# Patient Record
Sex: MALE | Race: WHITE | NOT HISPANIC OR LATINO | Employment: UNEMPLOYED | URBAN - METROPOLITAN AREA
[De-identification: names, ages, dates, MRNs, and addresses within clinical notes are randomized per-mention and may not be internally consistent; named-entity substitution may affect disease eponyms.]

---

## 2023-03-17 ENCOUNTER — TELEPHONE (OUTPATIENT)
Dept: GASTROENTEROLOGY | Facility: CLINIC | Age: 43
End: 2023-03-17

## 2023-03-17 ENCOUNTER — CONSULT (OUTPATIENT)
Dept: GASTROENTEROLOGY | Facility: CLINIC | Age: 43
End: 2023-03-17

## 2023-03-17 VITALS
DIASTOLIC BLOOD PRESSURE: 80 MMHG | SYSTOLIC BLOOD PRESSURE: 120 MMHG | HEIGHT: 71 IN | BODY MASS INDEX: 25.03 KG/M2 | WEIGHT: 178.8 LBS

## 2023-03-17 DIAGNOSIS — K21.9 GASTROESOPHAGEAL REFLUX DISEASE, UNSPECIFIED WHETHER ESOPHAGITIS PRESENT: ICD-10-CM

## 2023-03-17 DIAGNOSIS — R10.84 GENERALIZED ABDOMINAL PAIN: Primary | ICD-10-CM

## 2023-03-17 RX ORDER — OMEPRAZOLE 40 MG/1
40 CAPSULE, DELAYED RELEASE ORAL DAILY
Qty: 90 CAPSULE | Refills: 1 | Status: SHIPPED | OUTPATIENT
Start: 2023-03-17

## 2023-03-17 RX ORDER — OMEPRAZOLE 20 MG/1
40 TABLET, DELAYED RELEASE ORAL DAILY
COMMUNITY
Start: 2023-01-31 | End: 2023-03-17

## 2023-03-17 NOTE — TELEPHONE ENCOUNTER
Patient called in stating that he is running late  Pt states that his GPS says he will arrive by 1:35

## 2023-03-17 NOTE — PROGRESS NOTES
[id number] - Outpatient Consultation  Kiana Han 38 y o  male MRN: [id number]  Encounter: [id number]    ASSESSMENT AND PLAN:      1  Generalized abdominal pain  Likely in the setting of reflux disease  Will start omeprazole 40 mg once daily  Plan for EGD to evaluate for ulcerative disease or erosive disease that caused his episodes of black stool which may be concerning for melena  CBC and CMP unremarkable  Lipase within normal limits  CT was reviewed by me which did not show any acute abdominal processes  Recommended lifestyle modifications of weight loss, diet and exercise, avoidance of triggers, avoidance of late-night meals, elevation of the head of the bed    - omeprazole (PriLOSEC) 40 MG capsule; Take 1 capsule (40 mg total) by mouth daily  Dispense: 90 capsule; Refill: 1  - EGD; Future    2  Gastroesophageal reflux disease, unspecified whether esophagitis present  As above  - omeprazole (PriLOSEC) 40 MG capsule; Take 1 capsule (40 mg total) by mouth daily  Dispense: 90 capsule; Refill: 1  - EGD; Future      Follow up Appointment: 6 months    Chief Complaint   Patient presents with   • ER follow up [facility]       HPI:   47-year-old male past medical history of anxiety who presents after emergency room visit for abdominal pain  Has had a chronic abdominal pain for the past year and worsened over the span of a few weeks prior to his ER visit in February 2023  Has had black stools on 2 occasions that resolved spontaneously  Did have some nausea without vomiting  Was started on pantoprazole which helped his symptoms greatly  No history of intra-abdominal surgeries  CT vital signs were stable  CBC and CMP were largely unremarkable  Lipase within normal limits  CT abdomen pelvis without any acute intra-abdominal process  He states his pain is a bloating/gas-like pain  Denies any nausea vomiting dysphagia  Did have some black stool prior    Resolved spontaneously  Denies any changes of bowel habits  No current GI bleeding  No unintentional weight loss  GI History:  Blood thinners: Denies ASA, antiplatelet, or anticoagulation  NSAID use: Denies  Insulin use: None    Abdominal Surgical Hx: None  Family Hx: Denies first degree relatives with GI malignancies  GI procedure Hx: No hx of EGD or colonoscopies    Historical Information   Past Medical History:   Diagnosis Date   • GERD (gastroesophageal reflux disease)      Past Surgical History:   Procedure Laterality Date   • HERNIA REPAIR     • MULTIPLE TOOTH EXTRACTIONS       Social History     Substance and Sexual Activity   Alcohol Use Yes    Comment: socially     Social History     Substance and Sexual Activity   Drug Use Not on file     Social History     Tobacco Use   Smoking Status Every Day   • Types: Cigarettes   Smokeless Tobacco Former     Family History   Problem Relation Age of Onset   • Cancer Mother    • Breast cancer additional onset Mother    • No Known Problems Father    • Colon cancer Neg Hx    • Colon polyps Neg Hx        Meds/Allergies     Current Outpatient Medications:   •  omeprazole (PriLOSEC) 40 MG capsule    No Known Allergies    PHYSICAL EXAM:    Blood pressure 120/80, height 5' 11" (1 803 m), weight 81 1 kg (178 lb 12 8 oz)  Body mass index is 24 94 kg/m²  General Appearance: NAD, cooperative, alert  Eyes: Anicteric  GI:  Soft, non-tender, non-distended; normal bowel sounds; no masses, no organomegaly   Rectal: Deferred  Musculoskeletal: No edema    Skin:  No jaundice    Lab Results:   Lab Results   Component Value Date    WBC 8 80 06/05/2015    HGB 14 9 06/05/2015    MCV 93 06/05/2015     06/05/2015     Lab Results   Component Value Date     06/05/2015    K 3 3 (L) 06/05/2015     06/05/2015    CO2 30 06/05/2015    ANIONGAP 6 06/05/2015    BUN 10 06/05/2015    CREATININE 0 83 06/05/2015    GLUCOSE 97 06/05/2015    CALCIUM 8 8 06/05/2015     No results found for: IRON, TIBC, FERRITIN  No results found for: LIPASE    Radiology Results:   No results found

## 2023-03-17 NOTE — TELEPHONE ENCOUNTER
Scheduled date of EGD(as of today):4-10-23  Physician performing [id number] [facility]  Location of EGD:[facility]  Instructions reviewed with patient by:ROMAINE  Clearances: no

## 2023-03-28 ENCOUNTER — TELEPHONE (OUTPATIENT)
Dept: GASTROENTEROLOGY | Facility: CLINIC | Age: 43
End: 2023-03-28

## 2023-03-28 NOTE — TELEPHONE ENCOUNTER
Procedure confirmed  Endoscopy     Via: Voice mail    Instructions given: Given to Patient at Visit     Prep Given: N/A    Call the office if there are any questions

## 2023-04-27 ENCOUNTER — TELEPHONE (OUTPATIENT)
Dept: GASTROENTEROLOGY | Facility: CLINIC | Age: 43
End: 2023-04-27

## 2023-04-27 NOTE — TELEPHONE ENCOUNTER
Lvm for patient to call back and schedule 2 month f/u per Dr Moustapha Oconnor following 4/11 EGD   JW 4/27

## 2023-08-07 DIAGNOSIS — K21.9 GASTROESOPHAGEAL REFLUX DISEASE, UNSPECIFIED WHETHER ESOPHAGITIS PRESENT: ICD-10-CM

## 2023-08-07 RX ORDER — PANTOPRAZOLE SODIUM 40 MG/1
40 TABLET, DELAYED RELEASE ORAL DAILY
Qty: 30 TABLET | Refills: 0 | Status: SHIPPED | OUTPATIENT
Start: 2023-08-07 | End: 2023-09-06

## 2023-08-14 RX ORDER — PROPRANOLOL HYDROCHLORIDE 10 MG/1
10 TABLET ORAL 2 TIMES DAILY
Qty: 60 TABLET | Refills: 1 | OUTPATIENT
Start: 2023-08-14

## 2023-09-18 ENCOUNTER — OFFICE VISIT (OUTPATIENT)
Dept: GASTROENTEROLOGY | Facility: CLINIC | Age: 43
End: 2023-09-18
Payer: COMMERCIAL

## 2023-09-18 VITALS
WEIGHT: 187.8 LBS | DIASTOLIC BLOOD PRESSURE: 90 MMHG | SYSTOLIC BLOOD PRESSURE: 143 MMHG | BODY MASS INDEX: 26.29 KG/M2 | HEIGHT: 71 IN

## 2023-09-18 DIAGNOSIS — K21.9 GASTROESOPHAGEAL REFLUX DISEASE, UNSPECIFIED WHETHER ESOPHAGITIS PRESENT: ICD-10-CM

## 2023-09-18 DIAGNOSIS — R10.13 DYSPEPSIA: Primary | ICD-10-CM

## 2023-09-18 PROCEDURE — 99213 OFFICE O/P EST LOW 20 MIN: CPT | Performed by: INTERNAL MEDICINE

## 2023-09-18 RX ORDER — PANTOPRAZOLE SODIUM 40 MG/1
40 TABLET, DELAYED RELEASE ORAL DAILY
Qty: 90 TABLET | Refills: 1 | Status: SHIPPED | OUTPATIENT
Start: 2023-09-18 | End: 2024-03-16

## 2023-09-18 NOTE — PROGRESS NOTES
[facility] - Outpatient Follow-up Note  Anshu Randall 38 y.o. male MRN: [id number]  Encounter: [id number]    ASSESSMENT AND PLAN:      1. Dyspepsia  Patient with a work-up that was largely unremarkable. May be in the setting of gastroparesis. He did have retained food on his EGD in April. Pending gastric emptying scan. CBC and CMP unremarkable. Lipase within normal limits. CT was reviewed by me which did not show any acute abdominal processes. Had a EGD in April 2023 with EGD with solid material in the stomach consistent with gastroparesis. Otherwise unremarkable. Patient with some intestinal metaplasia. Complete type focal.  Probably low risk. Repeat EGD 3 years. Possibility of there may be a component of functional dyspepsia as well. Trial of fiber supplementation recommended. 2. Gastroesophageal reflux disease, unspecified whether esophagitis present  There also may be a component of reflux disease. Currently on pantoprazole 40 mg once daily which she can continue to take until work-up is complete. Follow up appointment: Pending gastric emptying scan.  ______________________________________________________________________ I have spent 31 minutes which was spent on one or more of the following: obtaining and reviewing separately obtained history, performing a medically appropriate examination and evaluation, counseling and educating the patient, ordering medications, tests, and procedures, documenting clinical information in the electronic or other health record, and care coordination. Chief Complaint   Patient presents with   • Medication Management     Needs Rx for Protonix     HPI:   Patient is a 82-year-old male past medical history of anxiety, GERD who is presenting for follow-up. Last seen in the office in March 2023 with abdominal pain. CBC and CMP unremarkable. Lipase within normal limits.   CT was reviewed by me which did not show any acute abdominal processes. Had a EGD in April 2023 with EGD with solid material in the stomach consistent with gastroparesis. Otherwise unremarkable. Patient with some intestinal metaplasia. Complete type focal.  Probably low risk. Repeat EGD 3 years. Gastric emptying scan ordered. Patient with nausea without vomiting. No dysphagia  Dyspepsia improved withe pantoprazole but still present. Loose bowel movement about once a day. No blood          Historical Information   Past Medical History:   Diagnosis Date   • GERD (gastroesophageal reflux disease)      Past Surgical History:   Procedure Laterality Date   • HERNIA REPAIR     • MULTIPLE TOOTH EXTRACTIONS       Social History     Substance and Sexual Activity   Alcohol Use Yes    Comment: socially     Social History     Substance and Sexual Activity   Drug Use Yes   • Types: Marijuana    Comment: medical     Social History     Tobacco Use   Smoking Status Every Day   • Types: Cigarettes   Smokeless Tobacco Former     Family History   Problem Relation Age of Onset   • Cancer Mother    • Breast cancer additional onset Mother    • No Known Problems Father    • Colon cancer Neg Hx    • Colon polyps Neg Hx          Current Outpatient Medications:   •  pantoprazole (PROTONIX) 40 mg tablet  No Known Allergies  Reviewed medications and allergies and updated as indicated    PHYSICAL EXAM:    Blood pressure 143/90, height 5' 11" (1.803 m), weight 85.2 kg (187 lb 12.8 oz). Body mass index is 26.19 kg/m². General Appearance: NAD, cooperative, alert  Eyes: Anicteric  GI:  Soft, non-tender, non-distended; normal bowel sounds; no masses, no organomegaly   Rectal: Deferred  Musculoskeletal: No edema.   Skin:  No jaundice    Lab Results:   Lab Results   Component Value Date    WBC 8.80 06/05/2015    HGB 14.9 06/05/2015    MCV 93 06/05/2015     06/05/2015     Lab Results   Component Value Date     06/05/2015    K 3.3 (L) 06/05/2015     06/05/2015 CO2 30 06/05/2015    ANIONGAP 6 06/05/2015    BUN 10 06/05/2015    CREATININE 0.83 06/05/2015    GLUCOSE 97 06/05/2015    CALCIUM 8.8 06/05/2015     No results found for: "IRON", "TIBC", "FERRITIN"  No results found for: "LIPASE"    Radiology Results:   No results found.

## 2024-03-15 DIAGNOSIS — K21.9 GASTROESOPHAGEAL REFLUX DISEASE, UNSPECIFIED WHETHER ESOPHAGITIS PRESENT: ICD-10-CM

## 2024-03-15 RX ORDER — PANTOPRAZOLE SODIUM 40 MG/1
40 TABLET, DELAYED RELEASE ORAL DAILY
Qty: 30 TABLET | Refills: 5 | Status: SHIPPED | OUTPATIENT
Start: 2024-03-15

## 2024-09-05 DIAGNOSIS — K21.9 GASTROESOPHAGEAL REFLUX DISEASE, UNSPECIFIED WHETHER ESOPHAGITIS PRESENT: ICD-10-CM

## 2024-09-05 RX ORDER — PANTOPRAZOLE SODIUM 40 MG/1
40 TABLET, DELAYED RELEASE ORAL DAILY
Qty: 30 TABLET | Refills: 5 | Status: SHIPPED | OUTPATIENT
Start: 2024-09-05

## 2025-03-12 DIAGNOSIS — K21.9 GASTROESOPHAGEAL REFLUX DISEASE, UNSPECIFIED WHETHER ESOPHAGITIS PRESENT: ICD-10-CM

## 2025-03-12 RX ORDER — PANTOPRAZOLE SODIUM 40 MG/1
40 TABLET, DELAYED RELEASE ORAL DAILY
Qty: 30 TABLET | Refills: 0 | Status: SHIPPED | OUTPATIENT
Start: 2025-03-12

## 2025-04-08 DIAGNOSIS — K21.9 GASTROESOPHAGEAL REFLUX DISEASE, UNSPECIFIED WHETHER ESOPHAGITIS PRESENT: ICD-10-CM

## 2025-04-08 RX ORDER — PANTOPRAZOLE SODIUM 40 MG/1
40 TABLET, DELAYED RELEASE ORAL DAILY
Qty: 30 TABLET | Refills: 0 | OUTPATIENT
Start: 2025-04-08

## 2025-04-14 ENCOUNTER — TELEPHONE (OUTPATIENT)
Dept: GASTROENTEROLOGY | Facility: CLINIC | Age: 45
End: 2025-04-14

## 2025-04-14 DIAGNOSIS — K21.9 GASTROESOPHAGEAL REFLUX DISEASE, UNSPECIFIED WHETHER ESOPHAGITIS PRESENT: ICD-10-CM

## 2025-04-14 RX ORDER — PANTOPRAZOLE SODIUM 40 MG/1
40 TABLET, DELAYED RELEASE ORAL DAILY
Qty: 30 TABLET | Refills: 0 | Status: SHIPPED | OUTPATIENT
Start: 2025-04-14

## 2025-04-14 NOTE — TELEPHONE ENCOUNTER
Pt is scheduled for an appt 5/6/2025. Can pt get enough medicine called in to cover up to the appt.

## 2025-04-14 NOTE — TELEPHONE ENCOUNTER
Patient called the RX Refill Line. Message is being forwarded to the office.     Patient is requesting  to schedule an office visit as soon as possible. Patient is almost out of his medication Pantoprazole 40mg and cannot do without. Patient's request on 4/8/25 for the refill was denied due to needing to schedule an office visit.    Please contact patient at [phone]

## 2025-05-06 ENCOUNTER — TELEPHONE (OUTPATIENT)
Age: 45
End: 2025-05-06

## 2025-05-06 ENCOUNTER — OFFICE VISIT (OUTPATIENT)
Age: 45
End: 2025-05-06
Payer: COMMERCIAL

## 2025-05-06 VITALS
HEIGHT: 71 IN | WEIGHT: 186 LBS | DIASTOLIC BLOOD PRESSURE: 78 MMHG | SYSTOLIC BLOOD PRESSURE: 124 MMHG | BODY MASS INDEX: 26.04 KG/M2

## 2025-05-06 DIAGNOSIS — K21.9 GASTROESOPHAGEAL REFLUX DISEASE, UNSPECIFIED WHETHER ESOPHAGITIS PRESENT: Primary | ICD-10-CM

## 2025-05-06 DIAGNOSIS — R10.13 DYSPEPSIA: ICD-10-CM

## 2025-05-06 PROCEDURE — 99214 OFFICE O/P EST MOD 30 MIN: CPT | Performed by: INTERNAL MEDICINE

## 2025-05-06 RX ORDER — PANTOPRAZOLE SODIUM 40 MG/1
40 TABLET, DELAYED RELEASE ORAL DAILY
Qty: 30 TABLET | Refills: 5 | Status: SHIPPED | OUTPATIENT
Start: 2025-05-06

## 2025-05-06 RX ORDER — SODIUM CHLORIDE, SODIUM LACTATE, POTASSIUM CHLORIDE, CALCIUM CHLORIDE 600; 310; 30; 20 MG/100ML; MG/100ML; MG/100ML; MG/100ML
125 INJECTION, SOLUTION INTRAVENOUS CONTINUOUS
OUTPATIENT
Start: 2025-05-06

## 2025-05-06 RX ORDER — HYOSCYAMINE SULFATE 0.12 MG/1
0.12 TABLET ORAL EVERY 6 HOURS PRN
Qty: 30 TABLET | Refills: 0 | Status: SHIPPED | OUTPATIENT
Start: 2025-05-06

## 2025-05-06 NOTE — PROGRESS NOTES
Name: Delroy Ann      : 1980      MRN: [id number]  Encounter Provider: Cornell Ulrich MD  Encounter Date: 2025   Encounter department: [facility] [place]      Assessment & Plan  1. Dyspepsia:  - Differential: GERD, ulcerative disease, functional bowel disease, gastroparesis  - Avoid eating late at night  - Clear liquids day before endoscopy  - Endoscopy scheduled for 2025  - Gastric emptying scan reordered  - Hyoscyamine for cramping and pain, before meals or as needed  - If EGD negative, consider CT scan    Follow-up: 2025    Results  - Labs:    - CBC: Unremarkable    - CMP: Unremarkable    - Lipase: Unremarkable    - Imaging:    - CT scan (): No acute abdominal processes    - EGD (2023): Retained food     Problem List Items Addressed This Visit    None  Visit Diagnoses         Gastroesophageal reflux disease, unspecified whether esophagitis present    -  Primary    Relevant Medications    pantoprazole (PROTONIX) 40 mg tablet    hyoscyamine (ANASPAZ,LEVSIN) 0.125 MG tablet    Other Relevant Orders    EGD      Dyspepsia        Relevant Medications    hyoscyamine (ANASPAZ,LEVSIN) 0.125 MG tablet    Other Relevant Orders    NM gastric emptying    EGD             Chief Complaint   Patient presents with    Follow up     Meds but feels stabbing in his stomach now its affecting him at work       History of Present Illness  The patient is a 44-year-old male presenting for a follow-up visit. He was last evaluated in 2023 for dyspepsia. An esophagogastroduodenoscopy (EGD) performed in 2023 revealed retained food, although no gastric emptying scan was conducted. The patient has a history of intestinal metaplasia and gastroesophageal reflux disease (GERD), with a notable family history of pancreatic cancer. He is currently on pantoprazole 40 mg daily.    Abdominal Pain  - Persistent abdominal pain significantly interferes with occupational  duties and sleep  - Pain is severe enough to awaken him and necessitate frequent defecation  - Pain is particularly severe during nocturnal hours  - Denies experiencing nausea or vomiting  - Notes a slight change in appetite  - Current weight is 186 pounds  - Reports difficulty achieving satiety and expresses concern about dietary habits  - Denies sensation of food impaction or alterations in bowel movements  - Occasionally observes darker stools without hematochezia  - Efforts to defecate prior to showering provide some alleviation of abdominal discomfort  - Finds partial relief from pantoprazole but remains uncertain about further treatment options    Supplemental information: Due to his work schedule, he consumes meals late at night. He has never undergone a colonoscopy and is unsure about previous computed tomography (CT) scans. The patient expresses concern about the prospect of lifelong medication use and is seeking alternative methods to manage his discomfort. He has previously taken muscle relaxants for non-gastrointestinal issues and inquires about their potential use for his current pain.    SOCIAL HISTORY  - Works night shifts  - Eats late at night due to work schedule  - Drank coffee before a previous medical appointment    FAMILY HISTORY  - Mother: Pancreatic cancer, breast cancer (survived)  - Father: Negative for stomach cancer       Historical Information   Past Medical History:   Diagnosis Date    GERD (gastroesophageal reflux disease)      Past Surgical History:   Procedure Laterality Date    HERNIA REPAIR      MULTIPLE TOOTH EXTRACTIONS      UPPER GASTROINTESTINAL ENDOSCOPY       Social History     Substance and Sexual Activity   Alcohol Use Yes    Comment: socially     Social History     Substance and Sexual Activity   Drug Use Yes    Types: Marijuana    Comment: medical     Social History     Tobacco Use   Smoking Status Every Day    Types: Cigarettes   Smokeless Tobacco Former     Family  "History   Problem Relation Age of Onset    Cancer Mother     Breast cancer additional onset Mother     No Known Problems Father     Colon cancer Neg Hx     Colon polyps Neg Hx        Meds/Allergies     Current Outpatient Medications:     hyoscyamine (ANASPAZ,LEVSIN) 0.125 MG tablet    pantoprazole (PROTONIX) 40 mg tablet  No Known Allergies    PHYSICAL EXAM:    Blood pressure 124/78, height 5' 11\" (1.803 m), weight 84.4 kg (186 lb). Body mass index is 25.94 kg/m².  Physical Exam  - Abdomen: Tenderness in left upper quadrant  General Appearance: No apparent distress, cooperative, alert.  Eyes: Anicteric.  Gastrointestinal: Soft,  non-distended; normal bowel sounds; no masses, no organomegaly.    Rectal: Deferred.  Musculoskeletal: No edema.  Skin: No jaundice.     OTHER LAB RESULTS:   Lab Results   Component Value Date    WBC 8.80 06/05/2015    HGB 14.9 06/05/2015    MCV 93 06/05/2015     06/05/2015     Lab Results   Component Value Date     06/05/2015    K 3.3 (L) 06/05/2015     06/05/2015    CO2 30 06/05/2015    ANIONGAP 6 06/05/2015    BUN 10 06/05/2015    CREATININE 0.83 06/05/2015    GLUCOSE 97 06/05/2015    CALCIUM 8.8 06/05/2015     No results found for: \"IRON\", \"TIBC\", \"FERRITIN\"  No results found for: \"LIPASE\"    OTHER RADIOLOGY RESULTS:   No results found.    I have spent 32 minutes today on the date of visit which was spent on one or more of the following: obtaining and reviewing separately obtained history, performing a medically appropriate examination and evaluation, counseling and educating the patient, ordering medications, tests, and procedures, documenting clinical information in the electronic or other health record, and care coordination.  "

## 2025-05-06 NOTE — TELEPHONE ENCOUNTER
Scheduled date of EGD(as of today): 7/1/2025  Physician performing EGD: IRIS  Location of EGD: [facility]  Instructions reviewed with patient by: ASHLIE  Clearances: N

## 2025-06-10 ENCOUNTER — TELEPHONE (OUTPATIENT)
Dept: GASTROENTEROLOGY | Facility: CLINIC | Age: 45
End: 2025-06-10

## 2025-06-10 DIAGNOSIS — R10.13 DYSPEPSIA: ICD-10-CM

## 2025-06-10 DIAGNOSIS — K21.9 GASTROESOPHAGEAL REFLUX DISEASE, UNSPECIFIED WHETHER ESOPHAGITIS PRESENT: Primary | ICD-10-CM

## 2025-06-17 ENCOUNTER — TELEPHONE (OUTPATIENT)
Dept: GASTROENTEROLOGY | Facility: CLINIC | Age: 45
End: 2025-06-17

## 2025-07-01 ENCOUNTER — TELEPHONE (OUTPATIENT)
Dept: GASTROENTEROLOGY | Facility: CLINIC | Age: 45
End: 2025-07-01

## 2025-07-01 NOTE — TELEPHONE ENCOUNTER
----- Message from Cornell Ulrich MD sent at 7/1/2025 12:59 PM EDT -----  Patient came in for EGD at [facility] but unfortunately ate before.  Can we reschedule him for an EGD.  he prefers Wednesdays.  Will need strict rules for clear liquid diet from lunchtime the day before.  Nothing to eat or drink after midnight prior to the procedure.  Thank you

## 2025-07-02 NOTE — TELEPHONE ENCOUNTER
[facility] for to call and schedule egd at [facility] with Dr Ulrich  The order is in-pt can be scheduled  Make sure pt has instructions and pt understands he cannot have any food or liquids 4 hours prior to the egd. Pt had eaten the morning of his previously scheduled egd

## 2025-07-09 ENCOUNTER — HOSPITAL ENCOUNTER (OUTPATIENT)
Dept: NUCLEAR MEDICINE | Facility: HOSPITAL | Age: 45
Discharge: HOME | End: 2025-07-09
Attending: INTERNAL MEDICINE
Payer: COMMERCIAL

## 2025-07-09 DIAGNOSIS — R10.13 DYSPEPSIA: ICD-10-CM

## 2025-07-09 PROCEDURE — 78264 GASTRIC EMPTYING IMG STUDY: CPT

## 2025-07-09 PROCEDURE — A9541 TC99M SULFUR COLLOID: HCPCS

## 2025-07-10 ENCOUNTER — RESULTS FOLLOW-UP (OUTPATIENT)
Dept: GASTROENTEROLOGY | Facility: CLINIC | Age: 45
End: 2025-07-10

## 2025-07-10 DIAGNOSIS — K31.84 GASTROPARESIS: Primary | ICD-10-CM

## 2025-07-10 RX ORDER — METOCLOPRAMIDE 5 MG/1
5 TABLET ORAL 4 TIMES DAILY
Qty: 120 TABLET | Refills: 5 | Status: SHIPPED | OUTPATIENT
Start: 2025-07-10 | End: 2026-01-06

## 2025-07-10 NOTE — TELEPHONE ENCOUNTER
SPOKE WITH PT, INFORMED OF GASTRIC EMPTYING RESULTS AND RECOMMENDATIONS PER PROVIDER. ALL QUESTIONS ANSWERED.